# Patient Record
Sex: MALE | NOT HISPANIC OR LATINO | URBAN - METROPOLITAN AREA
[De-identification: names, ages, dates, MRNs, and addresses within clinical notes are randomized per-mention and may not be internally consistent; named-entity substitution may affect disease eponyms.]

---

## 2017-05-17 ENCOUNTER — OFFICE VISIT (OUTPATIENT)
Dept: URGENT CARE | Facility: PHYSICIAN GROUP | Age: 27
End: 2017-05-17
Payer: COMMERCIAL

## 2017-05-17 VITALS
DIASTOLIC BLOOD PRESSURE: 90 MMHG | TEMPERATURE: 98.5 F | HEART RATE: 125 BPM | SYSTOLIC BLOOD PRESSURE: 124 MMHG | BODY MASS INDEX: 39.62 KG/M2 | WEIGHT: 253 LBS | OXYGEN SATURATION: 92 % | RESPIRATION RATE: 18 BRPM

## 2017-05-17 DIAGNOSIS — J06.9 VIRAL UPPER RESPIRATORY ILLNESS: ICD-10-CM

## 2017-05-17 PROCEDURE — 99214 OFFICE O/P EST MOD 30 MIN: CPT | Performed by: FAMILY MEDICINE

## 2017-05-17 RX ORDER — CODEINE PHOSPHATE AND GUAIFENESIN 10; 100 MG/5ML; MG/5ML
5 SOLUTION ORAL EVERY 6 HOURS PRN
Qty: 240 ML | Refills: 1 | Status: SHIPPED | OUTPATIENT
Start: 2017-05-17 | End: 2017-10-31

## 2017-05-17 ASSESSMENT — ENCOUNTER SYMPTOMS
CHILLS: 1
SORE THROAT: 1
SWEATS: 1
SHORTNESS OF BREATH: 1
RHINORRHEA: 1
FEVER: 0
HEADACHES: 1
DIZZINESS: 0
HEMOPTYSIS: 0
HEARTBURN: 1
MYALGIAS: 1
COUGH: 1
WHEEZING: 0

## 2017-05-17 NOTE — MR AVS SNAPSHOT
Ben Oquendo   2017 4:30 PM   Office Visit   MRN: [id number]    Department:  [facility]   Dept Phone:  [phone]    Description:  Male : 1990   Provider:  Alexey Prescott M.D.           Reason for Visit     Cough cough x2 days with fatigue      Allergies as of 2017     No Known Allergies      You were diagnosed with     Viral upper respiratory illness   [495112]         Vital Signs     Blood Pressure Pulse Temperature Respirations Weight Oxygen Saturation    124/90 mmHg 125 36.9 °C (98.5 °F) 18 114.76 kg (253 lb) 92%    Smoking Status                   Never Smoker            Basic Information     Date Of Birth Sex Race Ethnicity Preferred Language    1990 Male  or  Non- English      Problem List              ICD-10-CM Priority Class Noted - Resolved    Migraines G43.909   2009 - Present    Mild intermittent asthma J45.20   Unknown - Present    Migraine headache G43.909   Unknown - Present    Back strain S39.012A   2015 - Present      Health Maintenance        Date Due Completion Dates    IMM HEP B VACCINE (1 of 3 - Primary Series) 1992 ---    IMM HEP A VACCINE (1 of 2 - Standard Series) 1993 ---    IMM HPV VACCINE (1 of 3 - Male 3 Dose Series) 2003 ---    IMM VARICELLA (CHICKENPOX) VACCINE (1 of 2 - 2 Dose Adolescent Series) 2005 ---    IMM DTaP/Tdap/Td Vaccine (1 - Tdap) 2011 ---    IMM PNEUMOCOCCAL 19-64 (ADULT) MEDIUM RISK SERIES (1 of 1 - PPSV23) 2011 ---            Current Immunizations     No immunizations on file.      Below and/or attached are the medications your provider expects you to take. Review all of your home medications and newly ordered medications with your provider and/or pharmacist. Follow medication instructions as directed by your provider and/or pharmacist. Please keep your medication list with you and share with your provider. Update the information when medications are  discontinued, doses are changed, or new medications (including over-the-counter products) are added; and carry medication information at all times in the event of emergency situations     Allergies:  No Known Allergies          Medications  Valid as of: May 17, 2017 -  5:37 PM    Generic Name Brand Name Tablet Size Instructions for use    Albuterol Sulfate (Aero Soln) albuterol 108 (90 BASE) MCG/ACT Inhale 1-2 Puffs by mouth every 6 hours as needed for Shortness of Breath (cough).        Cyclobenzaprine HCl (Tab) FLEXERIL 10 MG Take 1 Tab by mouth 3 times a day as needed.        Fluticasone Propionate (Suspension) FLONASE 50 MCG/ACT Spray 1 Spray in nose 2 times a day.        Guaifenesin-Codeine (Solution) ROBITUSSIN -10 mg/5mL Take 5 mL by mouth every 6 hours as needed for Cough.        Ibuprofen (Tab) MOTRIN 600 MG Take 1 Tab by mouth every 6 hours as needed.        SUMAtriptan Succinate (Tab) IMITREX 50 MG Take 50 mg by mouth Once PRN.        SUMAtriptan Succinate (Tab) IMITREX 100 MG Take 1 Tab by mouth Once PRN for Migraine for up to 1 dose.        .                 Medicines prescribed today were sent to:     [facility] [address] [place] [address] - [address] [place] [address] AT [facility]. & [facility] [facility]    [address] [facility] [place] [employer] [facility] [address]    Phone: [phone] Fax: [phone]    Open 24 Hours?: No      Medication refill instructions:       If your prescription bottle indicates you have medication refills left, it is not necessary to call your provider’s office. Please contact your pharmacy and they will refill your medication.    If your prescription bottle indicates you do not have any refills left, you may request refills at any time through one of the following ways: The online [employer] system (except Urgent Care), by calling your provider’s office, or by asking your pharmacy to contact your provider’s office with a refill request. Medication refills are processed only during regular business  hours and may not be available until the next business day. Your provider may request additional information or to have a follow-up visit with you prior to refilling your medication.   *Please Note: Medication refills are assigned a new Rx number when refilled electronically. Your pharmacy may indicate that no refills were authorized even though a new prescription for the same medication is available at the pharmacy. Please request the medicine by name with the pharmacy before contacting your provider for a refill.           [employer] Access Code: [email or web address]  Expires: 6/16/2017  5:37 PM    Protein Forest  A secure, online tool to manage your health information     [employer]’s Protein Forest® is a secure, online tool that connects you to your personalized health information from the privacy of your home -- day or night - making it very easy for you to manage your healthcare. Once the activation process is completed, you can even access your medical information using the Protein Forest jose angel, which is available for free in the Apple Jose Angel store or Google Play store.     Protein Forest provides the following levels of access (as shown below):   My Chart Features   [facility] Doctor [facility]  Specialists [facility]  Urgent  Care Non-[facility]  Primary Care  Doctor   Email your healthcare team securely and privately 24/7 X X X    Manage appointments: schedule your next appointment; view details of past/upcoming appointments X      Request prescription refills. X      View recent personal medical records, including lab and immunizations X X X X   View health record, including health history, allergies, medications X X X X   Read reports about your outpatient visits, procedures, consult and ER notes X X X X   See your discharge summary, which is a recap of your hospital and/or ER visit that includes your diagnosis, lab results, and care plan. X X       How to register for [employer] Forest:  1. Go to  [email or web address].  2. Click on the Sign Up  Now box, which takes you to the New Member Sign Up page. You will need to provide the following information:  a. Enter your [employer] Access Code exactly as it appears at the top of this page. (You will not need to use this code after you’ve completed the sign-up process. If you do not sign up before the expiration date, you must request a new code.)   b. Enter your date of birth.   c. Enter your home email address.   d. Click Submit, and follow the next screen’s instructions.  3. Create a [employer] ID. This will be your [employer] login ID and cannot be changed, so think of one that is secure and easy to remember.  4. Create a [employer] password. You can change your password at any time.  5. Enter your Password Reset Question and Answer. This can be used at a later time if you forget your password.   6. Enter your e-mail address. This allows you to receive e-mail notifications when new information is available in [employer].  7. Click Sign Up. You can now view your health information.    For assistance activating your [employer] account, call [phone]

## 2017-05-18 NOTE — PROGRESS NOTES
Subjective:      Ben Oquendo is a 26 y.o. male who presents with Cough            Cough  This is a new problem. The current episode started in the past 7 days (4 days). The problem has been gradually worsening. The problem occurs constantly. The cough is productive of sputum. Associated symptoms include chest pain, chills, ear congestion, headaches, heartburn, myalgias, nasal congestion, postnasal drip, a rash, rhinorrhea, a sore throat, shortness of breath and sweats. Pertinent negatives include no ear pain, fever, hemoptysis or wheezing. Associated symptoms comments: Baseline heartburn, CP with cough. The symptoms are aggravated by lying down (nighttime). He has tried OTC cough suppressant (cough med with codeine for prior, dayquil, nyquil) for the symptoms. The treatment provided moderate relief. His past medical history is significant for asthma and bronchitis. There is no history of environmental allergies or pneumonia.       Review of Systems   Constitutional: Positive for chills. Negative for fever.   HENT: Positive for postnasal drip, rhinorrhea and sore throat. Negative for ear pain.    Respiratory: Positive for cough and shortness of breath. Negative for hemoptysis and wheezing.    Cardiovascular: Positive for chest pain.   Gastrointestinal: Positive for heartburn.   Musculoskeletal: Positive for myalgias.   Skin: Positive for rash.   Neurological: Positive for headaches. Negative for dizziness.   Endo/Heme/Allergies: Negative for environmental allergies.     PMH:  has a past medical history of Asthma; Migraine headache; and Back strain (7/13/2015).  MEDS:   Current outpatient prescriptions:   •  sumatriptan (IMITREX) 100 MG tablet, Take 1 Tab by mouth Once PRN for Migraine for up to 1 dose., Disp: 10 Tab, Rfl: 3  •  cyclobenzaprine (FLEXERIL) 10 MG TABS, Take 1 Tab by mouth 3 times a day as needed., Disp: 30 Tab, Rfl: 0  •  ibuprofen (MOTRIN) 600 MG TABS, Take 1 Tab by mouth every 6 hours as  needed., Disp: 90 Tab, Rfl: 1  •  sumatriptan (IMITREX) 50 MG TABS, Take 50 mg by mouth Once PRN., Disp: , Rfl:   •  fluticasone (FLONASE) 50 MCG/ACT nasal spray, Spray 1 Spray in nose 2 times a day., Disp: 1 Bottle, Rfl: 0  •  albuterol (VENTOLIN OR PROVENTIL) 108 (90 BASE) MCG/ACT AERS, Inhale 1-2 Puffs by mouth every 6 hours as needed for Shortness of Breath (cough)., Disp: 1 Inhaler, Rfl: 0  ALLERGIES: No Known Allergies  SURGHX: History reviewed. No pertinent past surgical history.  SOCHX:  reports that he has never smoked. He has never used smokeless tobacco. He reports that he does not drink alcohol or use illicit drugs.  FH: Family history was reviewed, no pertinent findings to report       Objective:     /90 mmHg  Pulse 125  Temp(Src) 36.9 °C (98.5 °F)  Resp 18  Wt 114.76 kg (253 lb)  SpO2 92%     Physical Exam   Constitutional: He appears well-developed.   HENT:   Head: Normocephalic.   Right Ear: External ear normal.   Left Ear: External ear normal.   Mouth/Throat: Oropharyngeal exudate present.   Nasal congestion    Eyes: Right eye exhibits no discharge. Left eye exhibits no discharge.   Neck: Normal range of motion. No tracheal deviation present. No thyromegaly present.   Cardiovascular: Normal rate.  Exam reveals no friction rub.    No murmur heard.  Pulmonary/Chest: Effort normal. No stridor. No respiratory distress. He has no wheezes.   Abdominal: Soft. He exhibits no distension. There is no tenderness. There is no guarding.   Lymphadenopathy:     He has no cervical adenopathy.   Neurological: He is alert.   Skin: Skin is warm and dry. He is not diaphoretic.   Psychiatric: He has a normal mood and affect. His behavior is normal.               Assessment/Plan:     1. Viral upper respiratory illness  guaifenesin-codeine (ROBITUSSIN AC) Solution oral solution     Supportive care  Push fluids  Monitor temperature  Follow-up if symptoms worsen or fail to improve    JAYESH report (no narc Rx since  mid 2016)

## 2017-10-31 ENCOUNTER — OFFICE VISIT (OUTPATIENT)
Dept: MEDICAL GROUP | Facility: PHYSICIAN GROUP | Age: 27
End: 2017-10-31
Payer: COMMERCIAL

## 2017-10-31 ENCOUNTER — HOSPITAL ENCOUNTER (OUTPATIENT)
Dept: LAB | Facility: MEDICAL CENTER | Age: 27
End: 2017-10-31
Attending: NURSE PRACTITIONER
Payer: COMMERCIAL

## 2017-10-31 VITALS
DIASTOLIC BLOOD PRESSURE: 84 MMHG | TEMPERATURE: 97.7 F | BODY MASS INDEX: 40.18 KG/M2 | WEIGHT: 256 LBS | RESPIRATION RATE: 12 BRPM | OXYGEN SATURATION: 94 % | HEIGHT: 67 IN | HEART RATE: 108 BPM | SYSTOLIC BLOOD PRESSURE: 120 MMHG

## 2017-10-31 DIAGNOSIS — G43.109 MIGRAINE WITH AURA AND WITHOUT STATUS MIGRAINOSUS, NOT INTRACTABLE: ICD-10-CM

## 2017-10-31 DIAGNOSIS — F41.9 ANXIETY AND DEPRESSION: ICD-10-CM

## 2017-10-31 DIAGNOSIS — G89.29 CHRONIC LEFT-SIDED THORACIC BACK PAIN: ICD-10-CM

## 2017-10-31 DIAGNOSIS — Z23 NEED FOR VACCINATION: ICD-10-CM

## 2017-10-31 DIAGNOSIS — M54.6 CHRONIC LEFT-SIDED THORACIC BACK PAIN: ICD-10-CM

## 2017-10-31 DIAGNOSIS — F32.A ANXIETY AND DEPRESSION: ICD-10-CM

## 2017-10-31 DIAGNOSIS — Z76.89 ENCOUNTER TO ESTABLISH CARE: ICD-10-CM

## 2017-10-31 LAB
25(OH)D3 SERPL-MCNC: 8 NG/ML (ref 30–100)
BASOPHILS # BLD AUTO: 0.6 % (ref 0–1.8)
BASOPHILS # BLD: 0.07 K/UL (ref 0–0.12)
EOSINOPHIL # BLD AUTO: 0.06 K/UL (ref 0–0.51)
EOSINOPHIL NFR BLD: 0.5 % (ref 0–6.9)
ERYTHROCYTE [DISTWIDTH] IN BLOOD BY AUTOMATED COUNT: 40.4 FL (ref 35.9–50)
HCT VFR BLD AUTO: 51.1 % (ref 42–52)
HGB BLD-MCNC: 16.9 G/DL (ref 14–18)
IMM GRANULOCYTES # BLD AUTO: 0.09 K/UL (ref 0–0.11)
IMM GRANULOCYTES NFR BLD AUTO: 0.8 % (ref 0–0.9)
LYMPHOCYTES # BLD AUTO: 3.51 K/UL (ref 1–4.8)
LYMPHOCYTES NFR BLD: 30.4 % (ref 22–41)
MCH RBC QN AUTO: 30 PG (ref 27–33)
MCHC RBC AUTO-ENTMCNC: 33.1 G/DL (ref 33.7–35.3)
MCV RBC AUTO: 90.6 FL (ref 81.4–97.8)
MONOCYTES # BLD AUTO: 0.72 K/UL (ref 0–0.85)
MONOCYTES NFR BLD AUTO: 6.2 % (ref 0–13.4)
NEUTROPHILS # BLD AUTO: 7.08 K/UL (ref 1.82–7.42)
NEUTROPHILS NFR BLD: 61.5 % (ref 44–72)
NRBC # BLD AUTO: 0 K/UL
NRBC BLD AUTO-RTO: 0 /100 WBC
PLATELET # BLD AUTO: 354 K/UL (ref 164–446)
PMV BLD AUTO: 10.5 FL (ref 9–12.9)
RBC # BLD AUTO: 5.64 M/UL (ref 4.7–6.1)
TSH SERPL DL<=0.005 MIU/L-ACNC: 2.58 UIU/ML (ref 0.3–3.7)
VIT B12 SERPL-MCNC: 531 PG/ML (ref 211–911)
WBC # BLD AUTO: 11.5 K/UL (ref 4.8–10.8)

## 2017-10-31 PROCEDURE — 85025 COMPLETE CBC W/AUTO DIFF WBC: CPT

## 2017-10-31 PROCEDURE — 90471 IMMUNIZATION ADMIN: CPT | Performed by: NURSE PRACTITIONER

## 2017-10-31 PROCEDURE — 90686 IIV4 VACC NO PRSV 0.5 ML IM: CPT | Performed by: NURSE PRACTITIONER

## 2017-10-31 PROCEDURE — 84443 ASSAY THYROID STIM HORMONE: CPT

## 2017-10-31 PROCEDURE — 99215 OFFICE O/P EST HI 40 MIN: CPT | Mod: 25 | Performed by: NURSE PRACTITIONER

## 2017-10-31 PROCEDURE — 82607 VITAMIN B-12: CPT

## 2017-10-31 PROCEDURE — 36415 COLL VENOUS BLD VENIPUNCTURE: CPT

## 2017-10-31 PROCEDURE — 82306 VITAMIN D 25 HYDROXY: CPT

## 2017-10-31 RX ORDER — SUMATRIPTAN 100 MG/1
100 TABLET, FILM COATED ORAL
Qty: 10 TAB | Refills: 3 | Status: SHIPPED | OUTPATIENT
Start: 2017-10-31 | End: 2021-04-20

## 2017-10-31 RX ORDER — FLUOXETINE 20 MG/1
TABLET, FILM COATED ORAL
Qty: 30 TAB | Refills: 0 | Status: SHIPPED | OUTPATIENT
Start: 2017-10-31 | End: 2017-11-21 | Stop reason: SDUPTHER

## 2017-10-31 RX ORDER — ALBUTEROL SULFATE 90 UG/1
2 AEROSOL, METERED RESPIRATORY (INHALATION) EVERY 6 HOURS PRN
Qty: 8.5 G | Refills: 3 | Status: SHIPPED | OUTPATIENT
Start: 2017-10-31 | End: 2022-11-18

## 2017-10-31 RX ORDER — ERGOCALCIFEROL 1.25 MG/1
50000 CAPSULE ORAL
Qty: 12 CAP | Refills: 0 | Status: SHIPPED | OUTPATIENT
Start: 2017-10-31 | End: 2021-04-20

## 2017-10-31 ASSESSMENT — PATIENT HEALTH QUESTIONNAIRE - PHQ9
4. FEELING TIRED OR HAVING LITTLE ENERGY: 2
2. FEELING DOWN, DEPRESSED, IRRITABLE, OR HOPELESS: 3
SUM OF ALL RESPONSES TO PHQ QUESTIONS 1-9: 18
5. POOR APPETITE OR OVEREATING: 2
8. MOVING OR SPEAKING SO SLOWLY THAT OTHER PEOPLE COULD HAVE NOTICED. OR THE OPPOSITE, BEING SO FIGETY OR RESTLESS THAT YOU HAVE BEEN MOVING AROUND A LOT MORE THAN USUAL: 0
SUM OF ALL RESPONSES TO PHQ9 QUESTIONS 1 AND 2: 6
1. LITTLE INTEREST OR PLEASURE IN DOING THINGS: 3
9. THOUGHTS THAT YOU WOULD BE BETTER OFF DEAD, OR OF HURTING YOURSELF: 3
6. FEELING BAD ABOUT YOURSELF - OR THAT YOU ARE A FAILURE OR HAVE LET YOURSELF OR YOUR FAMILY DOWN: 3
3. TROUBLE FALLING OR STAYING ASLEEP OR SLEEPING TOO MUCH: 2
7. TROUBLE CONCENTRATING ON THINGS, SUCH AS READING THE NEWSPAPER OR WATCHING TELEVISION: 0

## 2017-10-31 NOTE — LETTER
October 31, 2017      Dear Ben:    I have started you on fluoxetine a medication to help with your mood. In 10-21 days, you may see improvement in sleep, appetite, and daytime energy levels. In 6 weeks, you should start to see improvement in mood and emotion control.  In the first 1-2 weeks you may experience flu-like symptoms of nausea, headache, diarrhea. This is your body's response to becoming used to the change in serotonin levels. These symptoms most often subside after 1-2 weeks. If you develop severe diarrhea, vomiting, or severe headache, please let me know.  It is very important to not discontinue the medication in the initial 6 weeks. If you do not see improvement in 6 weeks, we can discuss increasing the dose or changing the medication at this time.   The length of time we will treat you with this will be for a period of 6 months beyond the point of therapeutic response.   Antidepressants are not addictive. They do not cause tolerance; you will not require higher and higher doses once an adequate dose for you is determined.   You should not drink alcohol while taking antidepressants. It can block the effects of the medication, making them less effective. Alcohol is also a depressant, therefore this does not help your recovery from depression and/or anxiety.   Exercise is a synergistic effect. It makes your antidepressant more effective. A healthy diet, with moderate carbohydrate intake, low sugar intake, minimal caffeine, and eating every 4-6 hours, will augment your efforts to treat depression.  Good sleep hygiene will help as well, going to bed at a regular time, and sleeping 8 hours a night. You may try things such as yoga, meditation, pilates, or other cardiovascular exercise to help.   Do not stop taking this medication cold turkey. If you have a desire to stop, please contact me so we can plan a taper to decrease to avoid symptoms.   ER warning signs: If you experience any suicidal thoughts,  call 9-1-1 or go to ER immediately.        If you have any questions or concerns, please don't hesitate to call.        Sincerely,        JONNY Ness.    Electronically Signed

## 2017-10-31 NOTE — ASSESSMENT & PLAN NOTE
Ongoing chronic intermittent problem for about two years. Pain occurs about once a week. Turning the wrong way triggers the pain. The pain is in middle of back, left spine. States pain is like pinching pain localized to this area. Episodes of pain last a few hours. No radiating pain into buttocks or LLE. He does have intermittent RLE posterior leg pain. X-ray of thoracic spine July 2015 reveals normal thoracic spine. Flexaril is not helping. No other treatments tried. He does sit at a desk for most of the day with his work.

## 2017-10-31 NOTE — ASSESSMENT & PLAN NOTE
Chronic problem since about age 18. Worsening this year. States no major significant trigger, but multiple different stressors in his life. Has significant family history of depression on his paternal side. Reports depression daily. Cannot sleep; always waking up t/o the night. Reports only eating once daily. When he does eat, he over-eats. Drinks 1/2 pint whiskey on his own 1-2 nights/week, used to be daily. At work a test was due in 2022, and he thought to himself that it would not matter because he would not be here. States he wishes he was dead daily. He has come up with plan in his head many times, but fears death, therefore he says he will never be able to act on his thoughts. He also states his son keeps him going. He does have 3 guns in his home (lives with parents who are aware of depression), but reports none of his plans have involved a gun.   Music helps him, but he can get back to the spot of depression in a moment.   He has tried CBD, and for 3-4 days he did not feel as depressed.     EARL 7 Score: 14    Patient Health Questionaire    Little interest or pleasure in doing things?: 3   Feeling down, depressed, or hopeless?: 3  Trouble falling or staying asleep, or sleeping too much? : 2  Feeling tired or having little energy? : 2  Poor appetite or overeating? : 2  Feeling bad about yourself - or that you are a failure or have let yourself or your family down? : 3  Trouble concentrating on things, such as reading the newspaper or watching television? : 0  Moving or speaking so slowly that other people could have noticed.  Or the opposite - being so fidgety or restless that you have been moving around alot more than usual. : 0  Thoughts that you would be better off dead, or of hurting yourself?: 3  Patient Health Questionnaire Score: 18    If depressive symptoms identified deferred to follow up visit unless specifically addressed in assesment and plan.    Interpretation of PHQ-9 Total Score   Score Severity    15-19 Moderately Severe Depression

## 2017-10-31 NOTE — ASSESSMENT & PLAN NOTE
Long-standing history of migraines since around age 18. Migraine is rare, but has been having regular headaches the past few weeks. Occasionally has loss of peripheral vision for aura; this is when he takes the sumatriptan. Occasionally gets N/V. +photophobia and phonophobia.

## 2017-11-01 NOTE — PROGRESS NOTES
Chief Complaint   Patient presents with   • Establish Care     depression       HPI:    Ben Oquendo is a 27 y.o. male here to establish care and discuss depression specifically     Chronic left-sided thoracic back pain  Ongoing chronic intermittent problem for about two years. Pain occurs about once a week. Turning the wrong way triggers the pain. The pain is in middle of back, left spine. States pain is like pinching pain localized to this area. Episodes of pain last a few hours. No radiating pain into buttocks or LLE. He does have intermittent RLE posterior leg pain. X-ray of thoracic spine July 2015 reveals normal thoracic spine. Flexaril is not helping. No other treatments tried. He does sit at a desk for most of the day with his work.     Migraines  Long-standing history of migraines since around age 18. Migraine is rare, but has been having regular headaches the past few weeks. Occasionally has loss of peripheral vision for aura; this is when he takes the sumatriptan. Occasionally gets N/V. +photophobia and phonophobia.     Anxiety and depression  Chronic problem since about age 18. Worsening this year. States no major significant trigger, but multiple different stressors in his life. Has significant family history of depression on his paternal side. Reports depression daily. Cannot sleep; always waking up t/o the night. Reports only eating once daily. When he does eat, he over-eats. Drinks 1/2 pint whiskey on his own 1-2 nights/week, used to be daily. At work a test was due in 2022, and he thought to himself that it would not matter because he would not be here. States he wishes he was dead daily. He has come up with plan in his head many times, but fears death, therefore he says he will never be able to act on his thoughts. He also states his son keeps him going. He does have 3 guns in his home (lives with parents who are aware of depression), but reports none of his plans have involved a gun.    Music helps him, but he can get back to the spot of depression in a moment.   He has tried CBD, and for 3-4 days he did not feel as depressed.     EARL 7 Score: 14    Patient Health Questionaire    Little interest or pleasure in doing things?: 3   Feeling down, depressed, or hopeless?: 3  Trouble falling or staying asleep, or sleeping too much? : 2  Feeling tired or having little energy? : 2  Poor appetite or overeating? : 2  Feeling bad about yourself - or that you are a failure or have let yourself or your family down? : 3  Trouble concentrating on things, such as reading the newspaper or watching television? : 0  Moving or speaking so slowly that other people could have noticed.  Or the opposite - being so fidgety or restless that you have been moving around alot more than usual. : 0  Thoughts that you would be better off dead, or of hurting yourself?: 3  Patient Health Questionnaire Score: 18    If depressive symptoms identified deferred to follow up visit unless specifically addressed in assesment and plan.    Interpretation of PHQ-9 Total Score   Score Severity   15-19 Moderately Severe Depression     BMI 40.0-44.9, adult (CMS-[facility])  Does not exercise regularly. Diet is poor. Eating once a day and binge eating.     Current medicines (including changes today)  Current Outpatient Prescriptions   Medication Sig Dispense Refill   • sumatriptan (IMITREX) 100 MG tablet Take 1 Tab by mouth Once PRN for Migraine for up to 1 dose. 10 Tab 3   • albuterol 108 (90 Base) MCG/ACT Aero Soln inhalation aerosol Inhale 2 Puffs by mouth every 6 hours as needed for Shortness of Breath. 8.5 g 3   • fluoxetine (PROZAC) 20 MG tablet Week 1: Take 0.5 tab po daily. Week 2: Increase to 1 tab po daily 30 Tab 0   • vitamin D, Ergocalciferol, (DRISDOL) 80095 units Cap capsule Take 1 Cap by mouth every 7 days. 12 Cap 0     No current facility-administered medications for this visit.      He  has a past medical history of Asthma; Back  "strain (2015); and Migraine headache.  He  has no past surgical history on file.  Social History   Substance Use Topics   • Smoking status: Never Smoker   • Smokeless tobacco: Never Used   • Alcohol use 4.8 oz/week     8 Shots of liquor per week     Social History     Social History Narrative   • No narrative on file     Family History   Problem Relation Age of Onset   • No Known Problems Mother    • Hypertension Father    • Depression Sister    • No Known Problems Brother    • No Known Problems Son    • Depression Paternal Aunt    • Depression Paternal Uncle    • Suicide Attempts Paternal Uncle    • Depression Paternal Aunt    • Depression Paternal Uncle    • Alcohol/Drug Neg Hx      Family Status   Relation Status   • Mother Alive   • Father Alive   • Sister Alive   • Brother Alive   • Son Alive   • Paternal Aunt Alive   • Paternal Uncle    • Paternal Aunt Alive   • Paternal Uncle Alive   • Neg Hx      Health Maintenance Topics with due status: Overdue       Topic Date Due    IMM HEP B VACCINE 1992    IMM HEP A VACCINE 1993    IMM VARICELLA (CHICKENPOX) VACCINE 2005    IMM DTaP/Tdap/Td Vaccine 2011    IMM PNEUMOCOCCAL 19-64 (ADULT) MEDIUM RISK SERIES 2011    IMM INFLUENZA 2017        ROS  Constitutional: +fatigue and low energy. +insomnia. No F/C, night sweats  Head/Neck: No unusual headache or dizziness  Lungs: No cough, sob, dyspnea, wheezing  Cardiac: No chest pain, palpitations  Skin: No color changes, itching, dryness, rashes  Endo: No heat or cold intolerance, poor concentration, change in skin/hair      Objective:     Blood pressure 120/84, pulse (!) 108, temperature 36.5 °C (97.7 °F), resp. rate 12, height 1.702 m (5' 7\"), weight 116.1 kg (256 lb), SpO2 94 %. Body mass index is 40.1 kg/m².  Physical Exam:  Alert, oriented in no acute distress.  Eye contact is fair, speech goal directed, affect calm. Tearful.  HEENT: EOMI, conjunctiva non-injected, sclera " non-icteric. No lid edema or eye drainage  Nares patent with no significant congestion or drainage.   Gross hearing intact   Anu pinna, external auditory canals, TM pearly gray with normal light reflex bilaterally.  Oral mucous membranes pink and moist with no lesions. Oropharynx without erythema or exudate  Neck: supple with no cervical or supraclavicular lymphadenopathy, palpable thyroid nodules or thyromegaly.  Lungs: unlabored, clear to auscultation bilaterally with good excursion.  CV: regular rate and rhythm, no murmurs  Lower extremities color normal, vascularity normal, no edema  Skin: No rashes or lesions in visible areas  Neuro: CNs grossly intact. Gait steady. Strength all extremities 5/5.         Assessment and Plan:   Assessment/Plan:  1. Encounter to establish care    2. Anxiety and depression  Not controlled. Medium suicide risk. Reports he will not commit suicide due to his need to be here for his son, and his fear of death. He does have history of coming up with plan though even without past two weeks. We will check Labs to ensure no contributing factors to depression. I have discussed with patient the need to stop drinking alcohol completely. I will urgently refer to psychology as I do feel therapy is a large component in the management of his care. His sister has done well with fluoxetine for her depression, therefore I feel this is a good medication to start with. He will take 10 mg on week one and increased to 20 mg on week 2. He will also speak with his boss to see if he is able to use marijuana for this and still maintain his job. If he can, he would like to pursue this before fluoxetine. I will see patient in 3 weeks for very close follow-up and have significantly discussed with him the possibility of increased suicidal thoughts with medication. He is aware that he needs to go to emergency department if this occurs.  - TSH; Future  - VITAMIN D,25 HYDROXY; Future  - VITAMIN B12; Future  -  CBC WITH DIFFERENTIAL; Future  - REFERRAL TO PSYCHOLOGY    3. Chronic left-sided thoracic back pain  Chronic problem not controlled. Appears muscular, but muscle relaxer not helping. Enc proper posture and ergonomics at his desk at work. Enc heating pad and foam rolling. Refer to PT  - REFERRAL TO PHYSICAL THERAPY Reason for Therapy: Eval/Treat/Report    4. Migraine with aura and without status migrainosus, not intractable  Chronic stable problem. May use imitrex PRN  - sumatriptan (IMITREX) 100 MG tablet; Take 1 Tab by mouth Once PRN for Migraine for up to 1 dose.  Dispense: 10 Tab; Refill: 3    5. BMI 40.0-44.9, adult (CMS-[facility])  - Patient identified as having weight management issue.  Appropriate orders and counseling given.    6. Need for vaccination  I have placed the below orders and discussed them with an approved delegating provider. The MA is performing the below orders under the direction of Dr. Luna  - INFLUENZA VACCINE QUAD INJ >3Y(PF)    The total time spent seeing the patient in consultation, and formulating an action plan for this visit was 40 minutes.  Greater than 50% of this time was spent face to face counseling, discussing problems documented above, coordinating care and answering questions by the provider.        Follow up:  Return in about 3 weeks (around 11/21/2017).    Educated in proper administration of medication(s) ordered today including safety, possible SE, risks, benefits, rationale and alternatives to therapy.   Supportive care, differential diagnoses, and indications for immediate follow-up discussed with patient.    Pathogenesis of diagnosis discussed including typical length and natural progression.    Instructed to return to clinic or nearest emergency department for any change in condition, further concerns, or worsening of symptoms.  Patient states understanding of the plan of care and discharge instructions.    Please note that this dictation was created using voice recognition  software. I have made every reasonable attempt to correct obvious errors, but I expect that there are errors of grammar and possibly content that I did not discover before finalizing the note.    Followup: Return in about 3 weeks (around 11/21/2017). sooner should new symptoms or problems arise.

## 2017-11-15 ENCOUNTER — TELEPHONE (OUTPATIENT)
Dept: MEDICAL GROUP | Facility: PHYSICIAN GROUP | Age: 27
End: 2017-11-15

## 2017-11-15 NOTE — TELEPHONE ENCOUNTER
Please inform patient that we can have him do an application for a medical marijuana card. There is no prescription, rather the card can be taken and used for this purpose. Please ensure he is scheduled with ESTEFANY Schaefer

## 2017-11-21 ENCOUNTER — OFFICE VISIT (OUTPATIENT)
Dept: MEDICAL GROUP | Facility: PHYSICIAN GROUP | Age: 27
End: 2017-11-21
Payer: COMMERCIAL

## 2017-11-21 VITALS
WEIGHT: 252 LBS | TEMPERATURE: 97.5 F | OXYGEN SATURATION: 93 % | RESPIRATION RATE: 16 BRPM | DIASTOLIC BLOOD PRESSURE: 84 MMHG | SYSTOLIC BLOOD PRESSURE: 128 MMHG | HEIGHT: 67 IN | BODY MASS INDEX: 39.55 KG/M2 | HEART RATE: 76 BPM

## 2017-11-21 DIAGNOSIS — M54.6 CHRONIC LEFT-SIDED THORACIC BACK PAIN: ICD-10-CM

## 2017-11-21 DIAGNOSIS — F32.A ANXIETY AND DEPRESSION: ICD-10-CM

## 2017-11-21 DIAGNOSIS — G89.29 CHRONIC LEFT-SIDED THORACIC BACK PAIN: ICD-10-CM

## 2017-11-21 DIAGNOSIS — F41.9 ANXIETY AND DEPRESSION: ICD-10-CM

## 2017-11-21 PROCEDURE — 99213 OFFICE O/P EST LOW 20 MIN: CPT | Performed by: NURSE PRACTITIONER

## 2017-11-21 RX ORDER — FLUOXETINE 20 MG/1
20 TABLET, FILM COATED ORAL DAILY
Qty: 90 TAB | Refills: 3 | Status: SHIPPED | OUTPATIENT
Start: 2017-11-21 | End: 2017-12-13 | Stop reason: SDUPTHER

## 2017-11-21 ASSESSMENT — PATIENT HEALTH QUESTIONNAIRE - PHQ9
SUM OF ALL RESPONSES TO PHQ QUESTIONS 1-9: 15
4. FEELING TIRED OR HAVING LITTLE ENERGY: 2
SUM OF ALL RESPONSES TO PHQ9 QUESTIONS 1 AND 2: 4
3. TROUBLE FALLING OR STAYING ASLEEP OR SLEEPING TOO MUCH: 3
7. TROUBLE CONCENTRATING ON THINGS, SUCH AS READING THE NEWSPAPER OR WATCHING TELEVISION: 2
6. FEELING BAD ABOUT YOURSELF - OR THAT YOU ARE A FAILURE OR HAVE LET YOURSELF OR YOUR FAMILY DOWN: 2
5. POOR APPETITE OR OVEREATING: 0
9. THOUGHTS THAT YOU WOULD BE BETTER OFF DEAD, OR OF HURTING YOURSELF: 2
1. LITTLE INTEREST OR PLEASURE IN DOING THINGS: 2
2. FEELING DOWN, DEPRESSED, IRRITABLE, OR HOPELESS: 2
8. MOVING OR SPEAKING SO SLOWLY THAT OTHER PEOPLE COULD HAVE NOTICED. OR THE OPPOSITE, BEING SO FIGETY OR RESTLESS THAT YOU HAVE BEEN MOVING AROUND A LOT MORE THAN USUAL: 0

## 2017-11-21 NOTE — ASSESSMENT & PLAN NOTE
Ongoing chronic intermittent problem for about two years. Pain occurs about once a week. Turning the wrong way triggers the pain. The pain is in middle of back, left spine. States pain is like pinching pain localized to this area. Episodes of pain last a few hours. No radiating pain into buttocks or LLE. He does have intermittent RLE posterior leg pain. X-ray of thoracic spine July 2015 reveals normal thoracic spine. Flexaril is not helping. No other treatments tried. He does sit at a desk for most of the day with his work. Postures appears poor.

## 2017-11-21 NOTE — ASSESSMENT & PLAN NOTE
Ongoing problem, new to me as of 3 weeks ago.  He has been on fluoxetine 20 mg daily for 3 weeks now. Reports before he was thinking about death all the time, but he has noticed this has lessened. Not noticing any significant changes yet though otherwise. He was using CBD, which he believes might have helped, but he is concerned about drug screens with this. He has an appointment scheduled next Monday with [facility]. Reports suicidal thoughts or plan are not occurring.     EARL 7 Score: 9 (down from 14 3 weeks ago)    Patient Health Questionaire    Little interest or pleasure in doing things?: 2   Feeling down, depressed, or hopeless?: 2  Trouble falling or staying asleep, or sleeping too much? : 3  Feeling tired or having little energy? : 2  Poor appetite or overeating? : 0  Feeling bad about yourself - or that you are a failure or have let yourself or your family down? : 2  Trouble concentrating on things, such as reading the newspaper or watching television? : 2  Moving or speaking so slowly that other people could have noticed.  Or the opposite - being so fidgety or restless that you have been moving around alot more than usual. : 0  Thoughts that you would be better off dead, or of hurting yourself?: 2  Patient Health Questionnaire Score: 15 (down from 18 3 weeks ago)    Interpretation of PHQ-9 Total Score   Score Severity   15-19 Moderately Severe Depression

## 2017-11-22 NOTE — PROGRESS NOTES
Subjective:     Chief Complaint   Patient presents with   • Follow-Up     1 month       HPI  Ben Oquendo is a 27 y.o. male here today for 3 week f/u     Chronic left-sided thoracic back pain  Ongoing chronic intermittent problem for about two years. Pain occurs about once a week. Turning the wrong way triggers the pain. The pain is in middle of back, left spine. States pain is like pinching pain localized to this area. Episodes of pain last a few hours. No radiating pain into buttocks or LLE. He does have intermittent RLE posterior leg pain. X-ray of thoracic spine July 2015 reveals normal thoracic spine. Flexaril is not helping. No other treatments tried. He does sit at a desk for most of the day with his work. Postures appears poor.     Anxiety and depression  Ongoing problem, new to me as of 3 weeks ago.  He has been on fluoxetine 20 mg daily for 3 weeks now. Reports before he was thinking about death all the time, but he has noticed this has lessened. Not noticing any significant changes yet though otherwise. He was using CBD, which he believes might have helped, but he is concerned about drug screens with this. He has an appointment scheduled next Monday with [facility]. Reports suicidal thoughts or plan are not occurring.     EARL 7 Score: 9 (down from 14 3 weeks ago)    Patient Health Questionaire    Little interest or pleasure in doing things?: 2   Feeling down, depressed, or hopeless?: 2  Trouble falling or staying asleep, or sleeping too much? : 3  Feeling tired or having little energy? : 2  Poor appetite or overeating? : 0  Feeling bad about yourself - or that you are a failure or have let yourself or your family down? : 2  Trouble concentrating on things, such as reading the newspaper or watching television? : 2  Moving or speaking so slowly that other people could have noticed.  Or the opposite - being so fidgety or restless that you have been moving around alot more than usual. :  "0  Thoughts that you would be better off dead, or of hurting yourself?: 2  Patient Health Questionnaire Score: 15 (down from 18 3 weeks ago)    Interpretation of PHQ-9 Total Score   Score Severity   15-19 Moderately Severe Depression           Diagnoses of Anxiety and depression and Chronic left-sided thoracic back pain were pertinent to this visit.    Allergies: Patient has no known allergies.  Current medicines (including changes today)  Current Outpatient Prescriptions   Medication Sig Dispense Refill   • fluoxetine (PROZAC) 20 MG tablet Take 1 Tab by mouth every day. 90 Tab 3   • sumatriptan (IMITREX) 100 MG tablet Take 1 Tab by mouth Once PRN for Migraine for up to 1 dose. 10 Tab 3   • albuterol 108 (90 Base) MCG/ACT Aero Soln inhalation aerosol Inhale 2 Puffs by mouth every 6 hours as needed for Shortness of Breath. 8.5 g 3   • vitamin D, Ergocalciferol, (DRISDOL) 88219 units Cap capsule Take 1 Cap by mouth every 7 days. 12 Cap 0     No current facility-administered medications for this visit.        He  has a past medical history of Asthma; Back strain (7/13/2015); and Migraine headache.    ROS  As stated in HPI      Objective:     Blood pressure 128/84, pulse 76, temperature 36.4 °C (97.5 °F), resp. rate 16, height 1.702 m (5' 7\"), weight 114.3 kg (252 lb), SpO2 93 %. Body mass index is 39.47 kg/m².  Physical Exam:  General: Alert, oriented, in no acute distress.  Eye contact is fair, speech goal directed, affect calm  CNs grossly intact.  Gross hearing intact.  MSK: Spine without deformity and no significant curvature on forward bend. +TTP of thoracic spinal muscles just left of spine and under scapula. Lumbar paraspinous muscles without tenderness or spasm. No spinous process tenderness. Able to perform flexion, extension, and lateral bend without difficulty. Able to perform FAROM of neck and bilateral shoulders without pain.  Gait steady.     Assessment and Plan:   Assessment/Plan:  1. Anxiety and " depression  Not controlled, but slight improvement present. Continue fluoxetine 20 mg daily for another 3 weeks. I will call patient by phone in 3 weeks to see how he is feeling and if we need to increase dose at this time, we will do it over the phone with a 6 week follow-up. He will [facility] with [facility] next week for therapy    2. Chronic left-sided thoracic back pain  Chronic problem not controlled. Appears muscular, but muscle relaxer not helping. Enc proper posture and ergonomics at his desk at work. Enc heating pad and foam rolling. Refer to PT  - REFERRAL TO PHYSICAL THERAPY Reason for Therapy: Eval/Treat/Report       Follow up:  Return in about 6 weeks (around 1/2/2018).    Educated in proper administration of medication(s) ordered today including safety, possible SE, risks, benefits, rationale and alternatives to therapy.   Supportive care, differential diagnoses, and indications for immediate follow-up discussed with patient.    Pathogenesis of diagnosis discussed including typical length and natural progression.    Instructed to return to clinic or nearest emergency department for any change in condition, further concerns, or worsening of symptoms.  Patient states understanding of the plan of care and discharge instructions.      Please note that this dictation was created using voice recognition software. I have made every reasonable attempt to correct obvious errors, but I expect that there are errors of grammar and possibly content that I did not discover before finalizing the note.    Followup: Return in about 6 weeks (around 1/2/2018). sooner should new symptoms or problems arise.

## 2017-12-13 ENCOUNTER — TELEPHONE (OUTPATIENT)
Dept: MEDICAL GROUP | Facility: PHYSICIAN GROUP | Age: 27
End: 2017-12-13

## 2017-12-13 RX ORDER — FLUOXETINE 20 MG/1
30 TABLET, FILM COATED ORAL DAILY
Qty: 135 TAB | Refills: 3 | Status: SHIPPED | OUTPATIENT
Start: 2017-12-13 | End: 2018-01-15

## 2017-12-14 NOTE — TELEPHONE ENCOUNTER
Spoke with patient who informs me depression is staying about the same. He would like Prozac increased. We will increase to 30 mg for 3 weeks and I will follow-up with him by phone again    JONNY Ness.

## 2017-12-28 ENCOUNTER — OFFICE VISIT (OUTPATIENT)
Dept: MEDICAL GROUP | Facility: PHYSICIAN GROUP | Age: 27
End: 2017-12-28
Payer: COMMERCIAL

## 2017-12-28 VITALS
TEMPERATURE: 97.7 F | OXYGEN SATURATION: 95 % | SYSTOLIC BLOOD PRESSURE: 104 MMHG | HEIGHT: 67 IN | HEART RATE: 98 BPM | DIASTOLIC BLOOD PRESSURE: 84 MMHG | BODY MASS INDEX: 38.14 KG/M2 | RESPIRATION RATE: 12 BRPM | WEIGHT: 243 LBS

## 2017-12-28 DIAGNOSIS — R31.0 GROSS HEMATURIA: ICD-10-CM

## 2017-12-28 LAB
APPEARANCE UR: CLEAR
BILIRUB UR STRIP-MCNC: NORMAL MG/DL
COLOR UR AUTO: YELLOW
GLUCOSE UR STRIP.AUTO-MCNC: NORMAL MG/DL
KETONES UR STRIP.AUTO-MCNC: NORMAL MG/DL
LEUKOCYTE ESTERASE UR QL STRIP.AUTO: NORMAL
NITRITE UR QL STRIP.AUTO: NORMAL
PH UR STRIP.AUTO: 6 [PH] (ref 5–8)
PROT UR QL STRIP: NORMAL MG/DL
RBC UR QL AUTO: NORMAL
SP GR UR STRIP.AUTO: 1
UROBILINOGEN UR STRIP-MCNC: 0.2 MG/DL

## 2017-12-28 PROCEDURE — 81002 URINALYSIS NONAUTO W/O SCOPE: CPT | Performed by: NURSE PRACTITIONER

## 2017-12-28 PROCEDURE — 99213 OFFICE O/P EST LOW 20 MIN: CPT | Performed by: NURSE PRACTITIONER

## 2017-12-29 NOTE — PROGRESS NOTES
"  Subjective:     Chief Complaint   Patient presents with   • Blood in Urine       HPI  Ben Oquendo is a 27 y.o. male here today for hematuria. Yesterday he woke up and with his first urination, still felt like he had to urinate but could not go. 5 minutes later felt urinary urgency, pressure, and pain in suprapubic region, also some in left flank region. This went away, and he went to work. Had urination again and within 5 minutes later pain became severe and he had to leave work. Once home, he had white lips, shivering, and urination was painful, urine was dark red/brown. After this, he felt instantly improved. 30 minutes later urinated again, and this was clear yellow. No pain or hematuria since. No further frequency, urgency, F/C. No urethral discharge. No change in sexual partner for 2 years.     The encounter diagnosis was Gross hematuria.    Allergies: Patient has no known allergies.  Current medicines (including changes today)  Current Outpatient Prescriptions   Medication Sig Dispense Refill   • fluoxetine (PROZAC) 20 MG tablet Take 1.5 Tabs by mouth every day. 135 Tab 3   • sumatriptan (IMITREX) 100 MG tablet Take 1 Tab by mouth Once PRN for Migraine for up to 1 dose. 10 Tab 3   • albuterol 108 (90 Base) MCG/ACT Aero Soln inhalation aerosol Inhale 2 Puffs by mouth every 6 hours as needed for Shortness of Breath. 8.5 g 3   • vitamin D, Ergocalciferol, (DRISDOL) 10679 units Cap capsule Take 1 Cap by mouth every 7 days. 12 Cap 0     No current facility-administered medications for this visit.        He  has a past medical history of Asthma; Back strain (7/13/2015); and Migraine headache.        ROS  As stated in HPI      Objective:     Blood pressure 104/84, pulse 98, temperature 36.5 °C (97.7 °F), resp. rate 12, height 1.702 m (5' 7\"), weight 110.2 kg (243 lb), SpO2 95 %. Body mass index is 38.06 kg/m².  Physical Exam:  General: Alert, oriented, in no acute distress.  Eye contact is good, speech " goal directed, affect calm  CNs grossly intact.  Gross hearing intact.  Abdomen: Soft, ND, non-tender. No CVAT or suprapubic tenderness  Ext: no edema, normal color and temperature.   Skin: No rashes or lesions in visible areas  Gait steady.      Ref. Range 12/28/2017 15:59   POC Color Latest Ref Range: Negative  yellow   POC Appearance Latest Ref Range: Negative  clear   POC Specific Gravity Latest Ref Range: <1.005 - >1.030  1.005   POC Urine PH Latest Ref Range: 5.0 - 8.0  6.0   POC Glucose Latest Ref Range: Negative mg/dL neg   POC Ketones Latest Ref Range: Negative mg/dL neg   POC Protein Latest Ref Range: Negative mg/dL neg   POC Nitrites Latest Ref Range: Negative  neg   POC Leukocyte Esterase Latest Ref Range: Negative  neg   POC Blood Latest Ref Range: Negative  neg   POC Biliruben Latest Ref Range: Negative mg/dL neg   POC Urobiligen Latest Ref Range: Negative (0.2) mg/dL 0.2       Assessment and Plan:   Assessment/Plan:  1. Gross hematuria  Discussed that I suspect this was an episode of kidney stone based on history of waxing and waning pain, with resolution after episode of severe pain. Urine normal today. Gave him urine kit and strainer in case episode happens again. Monitor.   - POCT Urinalysis  - URINALYSIS,CULTURE IF INDICATED; Future  - STONE ANALYSIS       Follow up:  Return in about 2 months (around 2/28/2018), or depression.    Educated in proper administration of medication(s) ordered today including safety, possible SE, risks, benefits, rationale and alternatives to therapy.   Supportive care, differential diagnoses, and indications for immediate follow-up discussed with patient.    Pathogenesis of diagnosis discussed including typical length and natural progression.    Instructed to return to clinic or nearest emergency department for any change in condition, further concerns, or worsening of symptoms.  Patient states understanding of the plan of care and discharge instructions.      Please  note that this dictation was created using voice recognition software. I have made every reasonable attempt to correct obvious errors, but I expect that there are errors of grammar and possibly content that I did not discover before finalizing the note.    Followup: Return in about 2 months (around 2/28/2018), or depression. sooner should new symptoms or problems arise.

## 2018-01-15 DIAGNOSIS — F41.9 ANXIETY AND DEPRESSION: ICD-10-CM

## 2018-01-15 DIAGNOSIS — F32.A ANXIETY AND DEPRESSION: ICD-10-CM

## 2018-01-15 RX ORDER — FLUOXETINE HYDROCHLORIDE 40 MG/1
40 CAPSULE ORAL DAILY
Qty: 90 CAP | Refills: 1 | Status: SHIPPED | OUTPATIENT
Start: 2018-01-15 | End: 2018-07-16 | Stop reason: SDUPTHER

## 2018-07-16 DIAGNOSIS — F32.A ANXIETY AND DEPRESSION: ICD-10-CM

## 2018-07-16 DIAGNOSIS — F41.9 ANXIETY AND DEPRESSION: ICD-10-CM

## 2018-07-16 RX ORDER — FLUOXETINE HYDROCHLORIDE 40 MG/1
CAPSULE ORAL
Qty: 90 CAP | Refills: 1 | Status: SHIPPED | OUTPATIENT
Start: 2018-07-16 | End: 2021-04-20

## 2019-04-11 ENCOUNTER — IMMUNIZATION (OUTPATIENT)
Dept: SOCIAL WORK | Facility: CLINIC | Age: 29
End: 2019-04-11

## 2019-04-11 DIAGNOSIS — Z23 NEED FOR VACCINATION: ICD-10-CM

## 2019-04-11 PROCEDURE — 90686 IIV4 VACC NO PRSV 0.5 ML IM: CPT | Performed by: REGISTERED NURSE

## 2021-04-20 ENCOUNTER — OFFICE VISIT (OUTPATIENT)
Dept: URGENT CARE | Facility: PHYSICIAN GROUP | Age: 31
End: 2021-04-20
Payer: COMMERCIAL

## 2021-04-20 VITALS
TEMPERATURE: 98.2 F | DIASTOLIC BLOOD PRESSURE: 92 MMHG | OXYGEN SATURATION: 96 % | SYSTOLIC BLOOD PRESSURE: 122 MMHG | RESPIRATION RATE: 20 BRPM | HEART RATE: 100 BPM

## 2021-04-20 DIAGNOSIS — R68.89 FLU-LIKE SYMPTOMS: ICD-10-CM

## 2021-04-20 DIAGNOSIS — Z20.822 SUSPECTED COVID-19 VIRUS INFECTION: ICD-10-CM

## 2021-04-20 DIAGNOSIS — R05.9 COUGH: ICD-10-CM

## 2021-04-20 LAB
FLUAV+FLUBV AG SPEC QL IA: NEGATIVE
INT CON NEG: NEGATIVE
INT CON POS: POSITIVE

## 2021-04-20 PROCEDURE — 99213 OFFICE O/P EST LOW 20 MIN: CPT | Performed by: NURSE PRACTITIONER

## 2021-04-20 PROCEDURE — 87804 INFLUENZA ASSAY W/OPTIC: CPT | Performed by: NURSE PRACTITIONER

## 2021-04-20 RX ORDER — DEXTROMETHORPHAN HYDROBROMIDE AND PROMETHAZINE HYDROCHLORIDE 15; 6.25 MG/5ML; MG/5ML
5 SYRUP ORAL EVERY 4 HOURS PRN
Qty: 100 ML | Refills: 0 | Status: SHIPPED | OUTPATIENT
Start: 2021-04-20 | End: 2022-11-18

## 2021-04-20 ASSESSMENT — ENCOUNTER SYMPTOMS
HEADACHES: 0
CHILLS: 0
DIARRHEA: 1
COUGH: 1
SORE THROAT: 1
WHEEZING: 0
VOMITING: 0
ABDOMINAL PAIN: 0
FEVER: 0
SHORTNESS OF BREATH: 0
SPUTUM PRODUCTION: 0
DIZZINESS: 0
MYALGIAS: 0
NAUSEA: 1

## 2021-04-20 NOTE — PROGRESS NOTES
Subjective:   Ben Oqunedo is a 30 y.o. male who presents for Fatigue (cough, sore throat pressire in ears both of them x2 days)      HPI  22-year-old male patient in urgent care for new onset fatigue, dry cough, sore throat and pressure in ears for the last 2 days.  Patient denies any Covid exposure.  Does state that he has some nausea and diarrhea.  He is taken over-the-counter DayQuil and NyQuil with no relief of symptoms.    Review of Systems   Constitutional: Positive for malaise/fatigue. Negative for chills and fever.   HENT: Positive for ear pain and sore throat.    Respiratory: Positive for cough. Negative for sputum production, shortness of breath and wheezing.    Gastrointestinal: Positive for diarrhea and nausea. Negative for abdominal pain and vomiting.   Musculoskeletal: Negative for myalgias.   Neurological: Negative for dizziness and headaches.   All other systems reviewed and are negative.      Patient Active Problem List   Diagnosis   • Migraines   • Exercise-induced asthma   • Chronic left-sided thoracic back pain   • BMI 40.0-44.9, adult (HCC)   • Anxiety and depression     History reviewed. No pertinent surgical history.  Social History     Tobacco Use   • Smoking status: Never Smoker   • Smokeless tobacco: Never Used   Substance Use Topics   • Alcohol use: Yes     Alcohol/week: 4.8 oz     Types: 8 Shots of liquor per week   • Drug use: Yes     Types: Marijuana     Comment: occasionally       Family History   Problem Relation Age of Onset   • No Known Problems Mother    • Hypertension Father    • Depression Sister    • No Known Problems Brother    • No Known Problems Son    • Depression Paternal Aunt    • Depression Paternal Uncle    • Suicide Attempts Paternal Uncle    • Depression Paternal Aunt    • Depression Paternal Uncle    • Alcohol/Drug Neg Hx       (Allergies, Medications, & Tobacco/Substance Use were reconciled by the Medical Assistant and reviewed by myself. The family history  is prepopulated)     Objective:     /92   Pulse 100   Temp 36.8 °C (98.2 °F) (Temporal)   Resp 20   SpO2 96%     Physical Exam  Vitals reviewed.   HENT:      Right Ear: Tympanic membrane, ear canal and external ear normal.      Left Ear: Tympanic membrane, ear canal and external ear normal.      Nose: Nose normal.      Mouth/Throat:      Lips: Pink.      Mouth: Mucous membranes are moist.      Pharynx: Uvula midline.   Cardiovascular:      Rate and Rhythm: Normal rate and regular rhythm.      Heart sounds: Normal heart sounds.   Pulmonary:      Effort: Pulmonary effort is normal.      Breath sounds: Normal breath sounds.   Abdominal:      General: Abdomen is flat. Bowel sounds are normal.      Palpations: Abdomen is soft.   Neurological:      Mental Status: He is alert and oriented to person, place, and time.   Psychiatric:         Mood and Affect: Mood normal.         Behavior: Behavior normal.         Thought Content: Thought content normal.         Judgment: Judgment normal.         Assessment/Plan:     1. Flu-like symptoms  POCT Influenza A/B   2. Suspected COVID-19 virus infection  COVID/SARS CoV-2 PCR   3. Cough  promethazine-dextromethorphan (PROMETHAZINE-DM) 6.25-15 MG/5ML syrup     Discussed Physical examination findings as well as negative influenza testing in clinic with patient are likely viral in etiology and could be due to COVID so will perform testing. Advised patient to remain in self isolation until cleared by a negative test or the health department, if they test positive. If exposed to COVID, advised to stay home for 10 days post exposure due to the possibility of developing symptoms day 2-10 post exposure. Will release results to [facility]. Strict ER precautions provided for worsening symptoms including SOB, difficulty breathing or high fever unable to be controlled by OTC tylenol or NSAIDS. Viral illness are largely self limiting and patient should increase fluids using electrolyte  enriched beverages as well as OTC medications such as tylenol and ibuprofen per 's instructions.      Appropriate PPE worn at all times by provider. Patient had face mask on for entirety of visit other than during oropharyngeal examination    Work note provided    Differential diagnosis, natural history, supportive care, and indications for immediate follow-up discussed.    Advised the patient to follow-up with the primary care physician for recheck, reevaluation, and consideration of further management.    Please note that this dictation was created using voice recognition software. I have made a reasonable attempt to correct obvious errors, but I expect that there are errors of grammar and possibly content that I did not discover before finalizing the note.    This note was electronically signed JONH Mcdonald

## 2022-11-18 ENCOUNTER — OFFICE VISIT (OUTPATIENT)
Dept: URGENT CARE | Facility: PHYSICIAN GROUP | Age: 32
End: 2022-11-18
Payer: COMMERCIAL

## 2022-11-18 ENCOUNTER — HOSPITAL ENCOUNTER (OUTPATIENT)
Facility: MEDICAL CENTER | Age: 32
End: 2022-11-18
Attending: STUDENT IN AN ORGANIZED HEALTH CARE EDUCATION/TRAINING PROGRAM
Payer: COMMERCIAL

## 2022-11-18 VITALS
WEIGHT: 270 LBS | TEMPERATURE: 97.7 F | DIASTOLIC BLOOD PRESSURE: 86 MMHG | SYSTOLIC BLOOD PRESSURE: 150 MMHG | RESPIRATION RATE: 16 BRPM | HEART RATE: 90 BPM | HEIGHT: 67 IN | BODY MASS INDEX: 42.38 KG/M2 | OXYGEN SATURATION: 95 %

## 2022-11-18 DIAGNOSIS — J02.9 PHARYNGITIS, UNSPECIFIED ETIOLOGY: ICD-10-CM

## 2022-11-18 DIAGNOSIS — J03.90 EXUDATIVE TONSILLITIS: ICD-10-CM

## 2022-11-18 DIAGNOSIS — J06.9 UPPER RESPIRATORY TRACT INFECTION, UNSPECIFIED TYPE: ICD-10-CM

## 2022-11-18 LAB
INT CON NEG: NORMAL
INT CON POS: NORMAL
S PYO AG THROAT QL: NEGATIVE

## 2022-11-18 PROCEDURE — 87077 CULTURE AEROBIC IDENTIFY: CPT

## 2022-11-18 PROCEDURE — 87070 CULTURE OTHR SPECIMN AEROBIC: CPT

## 2022-11-18 PROCEDURE — 0240U HCHG SARS-COV-2 COVID-19 NFCT DS RESP RNA 3 TRGT MIC: CPT

## 2022-11-18 PROCEDURE — 87880 STREP A ASSAY W/OPTIC: CPT | Performed by: STUDENT IN AN ORGANIZED HEALTH CARE EDUCATION/TRAINING PROGRAM

## 2022-11-18 PROCEDURE — 99213 OFFICE O/P EST LOW 20 MIN: CPT | Performed by: STUDENT IN AN ORGANIZED HEALTH CARE EDUCATION/TRAINING PROGRAM

## 2022-11-18 RX ORDER — METHYLPREDNISOLONE 4 MG/1
TABLET ORAL
Qty: 21 TABLET | Refills: 0 | Status: SHIPPED | OUTPATIENT
Start: 2022-11-18 | End: 2022-11-29

## 2022-11-18 RX ORDER — LIDOCAINE HYDROCHLORIDE 20 MG/ML
5 SOLUTION OROPHARYNGEAL PRN
Qty: 100 ML | Refills: 0 | Status: SHIPPED | OUTPATIENT
Start: 2022-11-18 | End: 2022-11-23

## 2022-11-18 ASSESSMENT — ENCOUNTER SYMPTOMS
PALPITATIONS: 0
HEADACHES: 0
SHORTNESS OF BREATH: 0
CHILLS: 0
DIZZINESS: 0
DIARRHEA: 0
WHEEZING: 0
CONSTIPATION: 0
VOMITING: 0
NAUSEA: 0
COUGH: 0
FEVER: 0
SORE THROAT: 1
ABDOMINAL PAIN: 0

## 2022-11-18 NOTE — LETTER
November 18, 2022    To Whom It May Concern:         This is confirmation that Ben Oquendo attended his scheduled appointment with Gisella Alvarez P.A.-C. on 11/18/22.  Please excuse work absences today through 11/21/22 for medical reasons. Ben can return to work on 11/22/2022 or earlier, if symptoms have improved/resolved and he has been without a fever for 24 hours (without treatment by antipyretic agents).         If you have any questions please do not hesitate to call me at the phone number listed below.    Sincerely,    Gisella Alvarez P.A.-C.  [phone]

## 2022-11-19 LAB
FLUAV RNA SPEC QL NAA+PROBE: NEGATIVE
FLUBV RNA SPEC QL NAA+PROBE: NEGATIVE
SARS-COV-2 RNA RESP QL NAA+PROBE: NOTDETECTED
SPECIMEN SOURCE: NORMAL

## 2022-11-19 NOTE — PROGRESS NOTES
"Subjective     Ben Oquendo is a 32 y.o. male who presents with Sore Throat (Started 4 days ago , congestion )            Ben is a 32 y.o. male who presents with symptoms of sore throat and nasal congestion starting approximately 4 days ago.  Patient states sore throat is a sore symptom and has progressively worsened since onset.  Patient denies fever/chills, cough, shortness of breath, wheezing.  No abdominal pain, N/V/D.  Patient denies sick contacts or close contacts experiencing similar symptoms.      Review of Systems   Constitutional:  Positive for malaise/fatigue. Negative for chills and fever.   HENT:  Positive for congestion, ear pain and sore throat.    Respiratory:  Negative for cough, shortness of breath and wheezing.    Cardiovascular:  Negative for chest pain and palpitations.   Gastrointestinal:  Negative for abdominal pain, constipation, diarrhea, nausea and vomiting.   Neurological:  Negative for dizziness and headaches.   All other systems reviewed and are negative.           Objective     BP (!) 150/86 (BP Location: Left arm, Patient Position: Sitting, BP Cuff Size: Adult)   Pulse 90   Temp 36.5 °C (97.7 °F) (Temporal)   Resp 16   Ht 1.702 m (5' 7\")   Wt 122 kg (270 lb)   SpO2 95%   BMI 42.29 kg/m²      Physical Exam  Vitals reviewed.   Constitutional:       General: He is not in acute distress.     Appearance: Normal appearance. He is ill-appearing. He is not toxic-appearing.   HENT:      Head: Normocephalic and atraumatic.      Right Ear: Tympanic membrane, ear canal and external ear normal.      Left Ear: Tympanic membrane, ear canal and external ear normal.      Nose: Congestion present.      Mouth/Throat:      Lips: Pink.      Mouth: Mucous membranes are moist.      Pharynx: Oropharynx is clear. Uvula midline. Posterior oropharyngeal erythema present. No oropharyngeal exudate.      Tonsils: Tonsillar exudate present. 1+ on the right. 1+ on the left.   Eyes:      Extraocular " Movements: Extraocular movements intact.      Conjunctiva/sclera: Conjunctivae normal.      Pupils: Pupils are equal, round, and reactive to light.   Cardiovascular:      Pulses: Normal pulses.      Heart sounds: Normal heart sounds.   Pulmonary:      Effort: Pulmonary effort is normal.      Breath sounds: Normal breath sounds.   Musculoskeletal:      Cervical back: Normal range of motion. No rigidity.   Lymphadenopathy:      Cervical: Cervical adenopathy present.   Skin:     General: Skin is warm and dry.   Neurological:      General: No focal deficit present.      Mental Status: He is alert. Mental status is at baseline.                           Assessment & Plan        1. Pharyngitis, unspecified etiology  - POCT Rapid Strep A  - methylPREDNISolone (MEDROL DOSEPAK) 4 MG Tablet Therapy Pack; Per  directions on package  Dispense: 21 Tablet; Refill: 0  - CULTURE THROAT; Future  - lidocaine (XYLOCAINE) 2 % Solution; Take 5 mL by mouth as needed for Throat/Mouth Pain for up to 5 days.  Dispense: 100 mL; Refill: 0    2. Upper respiratory tract infection, unspecified type  - CoV-2 and Flu A/B by PCR (24 hour In-House): Collect NP swab in [facility]; Future    3. Exudative tonsillitis  - POCT Rapid Strep A  - methylPREDNISolone (MEDROL DOSEPAK) 4 MG Tablet Therapy Pack; Per  directions on package  Dispense: 21 Tablet; Refill: 0  - CULTURE THROAT; Future  - lidocaine (XYLOCAINE) 2 % Solution; Take 5 mL by mouth as needed for Throat/Mouth Pain for up to 5 days.  Dispense: 100 mL; Refill: 0     POCT Rapid Strep A: NEGATIVE  CULTURE THROAT collected and sent out to lab.  Patient will be notified of any positive results and started on appropriate antibiotic therapy at that time if indicated.  CoV-2 and Flu A/B by PCR collected and results will be available via [employer].    Patient educated on supportive care measures. Supportive measures may include:Increase daily water intake and make sure getting adequate  rest. OTC analgesics and antipyretics (i.e. NSAIDs and acetaminophen) can be used for pain and fever relief as needed.Mechanical saline irrigation and intranasal saline spray may temporarily improve nasal patency and loosen secretions. Inhalation of warm/humidified air (steam) may provide relief of symptoms.     Differential diagnoses, supportive care, and indications for immediate follow-up discussed with patient. Pathogenesis of diagnosis discussed including typical length and natural progression (See AVS).    Instructed to return to urgent care or nearest emergency department if symptoms fail to improve, for any change in condition, further concerns, or new concerning symptoms.    Patient states understanding and agrees with the plan of care and discharge instructions.

## 2022-11-21 LAB
BACTERIA SPEC RESP CULT: NORMAL
SIGNIFICANT IND 70042: NORMAL
SITE SITE: NORMAL
SOURCE SOURCE: NORMAL

## 2022-11-29 ENCOUNTER — OFFICE VISIT (OUTPATIENT)
Dept: URGENT CARE | Facility: PHYSICIAN GROUP | Age: 32
End: 2022-11-29
Payer: COMMERCIAL

## 2022-11-29 VITALS
BODY MASS INDEX: 42.38 KG/M2 | SYSTOLIC BLOOD PRESSURE: 126 MMHG | RESPIRATION RATE: 18 BRPM | HEART RATE: 131 BPM | WEIGHT: 270 LBS | TEMPERATURE: 97.4 F | HEIGHT: 67 IN | OXYGEN SATURATION: 97 % | DIASTOLIC BLOOD PRESSURE: 86 MMHG

## 2022-11-29 DIAGNOSIS — R09.81 NASAL CONGESTION: ICD-10-CM

## 2022-11-29 DIAGNOSIS — H92.01 RIGHT EAR PAIN: ICD-10-CM

## 2022-11-29 DIAGNOSIS — J01.90 ACUTE NON-RECURRENT SINUSITIS, UNSPECIFIED LOCATION: ICD-10-CM

## 2022-11-29 PROCEDURE — 99213 OFFICE O/P EST LOW 20 MIN: CPT | Performed by: NURSE PRACTITIONER

## 2022-11-29 RX ORDER — METHYLPREDNISOLONE 4 MG/1
TABLET ORAL
Qty: 1 EACH | Refills: 0 | Status: SHIPPED | OUTPATIENT
Start: 2022-11-29

## 2022-11-29 RX ORDER — FLUTICASONE PROPIONATE 50 MCG
SPRAY, SUSPENSION (ML) NASAL
Qty: 9.1 ML | Refills: 0 | Status: SHIPPED | OUTPATIENT
Start: 2022-11-29

## 2022-11-29 RX ORDER — AMOXICILLIN AND CLAVULANATE POTASSIUM 875; 125 MG/1; MG/1
1 TABLET, FILM COATED ORAL 2 TIMES DAILY
Qty: 10 TABLET | Refills: 0 | Status: SHIPPED | OUTPATIENT
Start: 2022-11-29 | End: 2022-12-04

## 2022-11-29 ASSESSMENT — ENCOUNTER SYMPTOMS
CONSTITUTIONAL NEGATIVE: 1
SHORTNESS OF BREATH: 0
COUGH: 1
FEVER: 0

## 2022-11-29 ASSESSMENT — VISUAL ACUITY: OU: 1

## 2022-11-30 NOTE — PROGRESS NOTES
Subjective:     Ben Oquendo is a 32 y.o. male who presents for Pharyngitis (Steroids helped, but now back/Onset 2 weeks/Strep, COVID, Flu negative last visit), Cough (Productive/Onset 1 week), and Otalgia (R ear pain/Onset 1 week)       Pharyngitis   This is a new problem. Associated symptoms include congestion, coughing and ear pain (Right). Pertinent negatives include no shortness of breath.   Cough  Associated symptoms include ear pain (Right). Pertinent negatives include no fever or shortness of breath.   Otalgia   Associated symptoms include coughing.     Patient seen in urgent care 11/18/2022.  Had sore throat and nasal congestion.  Strep swab was negative.  Throat culture was negative.  Flu and COVID PCR negative.    Treated with Medrol Dosepak and viscous lidocaine.    Patient reports initial improvement in symptoms on medication.  However, getting worse after finishing the steroid.  Reports worsening right ear pain.  Reports worsening sore throat.    Reports his symptoms initially started 1 week prior to his initial visit.  Denies history of environmental allergies.    Review of Systems   Constitutional: Negative.  Negative for fever and malaise/fatigue.   HENT:  Positive for congestion and ear pain (Right).    Respiratory:  Positive for cough. Negative for shortness of breath.    All other systems reviewed and are negative.    Refer to HPI for additional details.    During this visit, appropriate PPE was worn, hand hygiene was performed, and the patient and any visitors were masked.    PMH:  has a past medical history of Asthma, Back strain (7/13/2015), and Migraine headache.    MEDS:   Current Outpatient Medications:     amoxicillin-clavulanate (AUGMENTIN) 875-125 MG Tab, Take 1 Tablet by mouth 2 times a day for 5 days., Disp: 10 Tablet, Rfl: 0    methylPREDNISolone (MEDROL DOSEPAK) 4 MG Tablet Therapy Pack, Use as directed on package. May take all of Day 1 as a single dose (24 mg) when  "starting., Disp: 1 Each, Rfl: 0    fluticasone (FLONASE) 50 MCG/ACT nasal spray, 1 spray in each nostril 2 times a day, Disp: 9.1 mL, Rfl: 0    ALLERGIES: No Known Allergies  SURGHX: History reviewed. No pertinent surgical history.  SOCHX:  reports that he has never smoked. He has never used smokeless tobacco. He reports that he does not currently use alcohol after a past usage of about 4.8 oz per week. He reports that he does not currently use drugs after having used the following drugs: Marijuana.    FH: Per HPI as applicable/pertinent.      Objective:     /86 (BP Location: Right arm, Patient Position: Sitting, BP Cuff Size: Large adult)   Pulse (!) 131   Temp 36.3 °C (97.4 °F) (Temporal)   Resp 18   Ht 1.702 m (5' 7\")   Wt 122 kg (270 lb)   SpO2 97%   BMI 42.29 kg/m²     Physical Exam  Nursing note reviewed.   Constitutional:       General: He is not in acute distress.     Appearance: He is well-developed. He is not ill-appearing or toxic-appearing.   HENT:      Right Ear: Tympanic membrane normal. Tympanic membrane is not perforated, erythematous or bulging.      Left Ear: Tympanic membrane normal. Tympanic membrane is not perforated, erythematous or bulging.      Nose: Congestion present.      Mouth/Throat:      Mouth: Mucous membranes are moist.      Pharynx: Oropharynx is clear.   Eyes:      General: Vision grossly intact.   Cardiovascular:      Rate and Rhythm: Tachycardia present.   Pulmonary:      Effort: Pulmonary effort is normal. No respiratory distress.      Comments: Phonation normal, speaks in full sentences, no audible wheeze or stridor   Musculoskeletal:         General: No deformity. Normal range of motion.   Skin:     General: Skin is warm and dry.      Coloration: Skin is not pale.   Neurological:      Mental Status: He is alert and oriented to person, place, and time.      Motor: No weakness.   Psychiatric:         Behavior: Behavior normal. Behavior is cooperative. "       Assessment/Plan:     1. Nasal congestion  - methylPREDNISolone (MEDROL DOSEPAK) 4 MG Tablet Therapy Pack; Use as directed on package. May take all of Day 1 as a single dose (24 mg) when starting.  Dispense: 1 Each; Refill: 0  - fluticasone (FLONASE) 50 MCG/ACT nasal spray; 1 spray in each nostril 2 times a day  Dispense: 9.1 mL; Refill: 0    2. Right ear pain  - methylPREDNISolone (MEDROL DOSEPAK) 4 MG Tablet Therapy Pack; Use as directed on package. May take all of Day 1 as a single dose (24 mg) when starting.  Dispense: 1 Each; Refill: 0  - fluticasone (FLONASE) 50 MCG/ACT nasal spray; 1 spray in each nostril 2 times a day  Dispense: 9.1 mL; Refill: 0    3. Acute non-recurrent sinusitis, unspecified location  - amoxicillin-clavulanate (AUGMENTIN) 875-125 MG Tab; Take 1 Tablet by mouth 2 times a day for 5 days.  Dispense: 10 Tablet; Refill: 0  - methylPREDNISolone (MEDROL DOSEPAK) 4 MG Tablet Therapy Pack; Use as directed on package. May take all of Day 1 as a single dose (24 mg) when starting.  Dispense: 1 Each; Refill: 0  - fluticasone (FLONASE) 50 MCG/ACT nasal spray; 1 spray in each nostril 2 times a day  Dispense: 9.1 mL; Refill: 0    Greater than 2 weeks since initial symptom onset.  Had initial improvement now worsening.  Denies history of environmental allergies.  Empiric antibiotic for sinusitis.  Second round of Medrol Dosepak for symptom relief.  Start Flonase.  Start OTC Zyrtec/Claritin.    Warning signs reviewed. Return precautions discussed.     Differential diagnosis, natural history, supportive care, over-the-counter symptom management per 's instructions, close monitoring, and indications for immediate follow-up discussed.     All questions answered. Patient agrees with the plan of care.

## 2022-12-26 DIAGNOSIS — H92.01 RIGHT EAR PAIN: ICD-10-CM

## 2022-12-26 DIAGNOSIS — J01.90 ACUTE NON-RECURRENT SINUSITIS, UNSPECIFIED LOCATION: ICD-10-CM

## 2022-12-26 DIAGNOSIS — R09.81 NASAL CONGESTION: ICD-10-CM

## 2023-03-26 RX ORDER — FLUTICASONE PROPIONATE 50 MCG
SPRAY, SUSPENSION (ML) NASAL
Qty: 48 G | OUTPATIENT
Start: 2023-03-26

## 2024-11-06 ENCOUNTER — OFFICE VISIT (OUTPATIENT)
Dept: URGENT CARE | Facility: PHYSICIAN GROUP | Age: 34
End: 2024-11-06
Payer: COMMERCIAL

## 2024-11-06 VITALS
DIASTOLIC BLOOD PRESSURE: 78 MMHG | WEIGHT: 270.06 LBS | HEART RATE: 113 BPM | SYSTOLIC BLOOD PRESSURE: 118 MMHG | HEIGHT: 67 IN | RESPIRATION RATE: 21 BRPM | OXYGEN SATURATION: 98 % | BODY MASS INDEX: 42.39 KG/M2 | TEMPERATURE: 99 F

## 2024-11-06 DIAGNOSIS — J45.901 MILD ASTHMA EXACERBATION: ICD-10-CM

## 2024-11-06 DIAGNOSIS — H66.002 NON-RECURRENT ACUTE SUPPURATIVE OTITIS MEDIA OF LEFT EAR WITHOUT SPONTANEOUS RUPTURE OF TYMPANIC MEMBRANE: ICD-10-CM

## 2024-11-06 DIAGNOSIS — R50.9 FEVER, UNSPECIFIED FEVER CAUSE: ICD-10-CM

## 2024-11-06 DIAGNOSIS — J06.9 UPPER RESPIRATORY TRACT INFECTION, UNSPECIFIED TYPE: ICD-10-CM

## 2024-11-06 LAB
FLUAV RNA SPEC QL NAA+PROBE: NEGATIVE
FLUBV RNA SPEC QL NAA+PROBE: NEGATIVE
RSV RNA SPEC QL NAA+PROBE: NEGATIVE
SARS-COV-2 RNA RESP QL NAA+PROBE: NEGATIVE

## 2024-11-06 PROCEDURE — 0241U POCT CEPHEID COV-2, FLU A/B, RSV - PCR: CPT

## 2024-11-06 PROCEDURE — 3078F DIAST BP <80 MM HG: CPT

## 2024-11-06 PROCEDURE — 3074F SYST BP LT 130 MM HG: CPT

## 2024-11-06 PROCEDURE — 99214 OFFICE O/P EST MOD 30 MIN: CPT

## 2024-11-06 RX ORDER — PREDNISONE 10 MG/1
40 TABLET ORAL DAILY
Qty: 20 TABLET | Refills: 0 | Status: SHIPPED | OUTPATIENT
Start: 2024-11-06 | End: 2024-11-11

## 2024-11-06 RX ORDER — ALBUTEROL SULFATE 90 UG/1
2 INHALANT RESPIRATORY (INHALATION) EVERY 6 HOURS PRN
Qty: 8.5 G | Refills: 0 | Status: SHIPPED | OUTPATIENT
Start: 2024-11-06

## 2024-11-06 RX ORDER — DEXTROMETHORPHAN HYDROBROMIDE AND PROMETHAZINE HYDROCHLORIDE 15; 6.25 MG/5ML; MG/5ML
5 SYRUP ORAL EVERY 6 HOURS PRN
Qty: 118 ML | Refills: 0 | Status: SHIPPED | OUTPATIENT
Start: 2024-11-06 | End: 2024-11-13

## 2024-11-06 NOTE — LETTER
November 6, 2024      To Whom It May Concern:         This is confirmation that Ben Oquendo attended his scheduled appointment with ESTEFANY Wynn on 11/06/24.    Please excuse patient from work until he is free of fever for 24 hours without the use of Tylenol or Ibuprofen.         If you have any questions please do not hesitate to call me at the phone number listed below.    Sincerely,          Ivelsise Bagley A.P.R.N.  [phone]

## 2024-11-06 NOTE — PROGRESS NOTES
Subjective:   Ben Oquendo is a 34 y.o. male who presents for Cough (C/o cough, sore throat x3 days. Overall tired. )      HPI:    Patient presents to urgent care with concerns of rhinorrhea, nasal congestion, headache, sore throat, dry cough.  Reports bilateral ear pain started yesterday. The left is worse than the right. Denies otorrhea. Pain is described as pressure and fullness. Radiates to jaw.  Onset was 3 days ago  Reports wheezing, chest tightness, SOB  Has history of asthma, states it is mostly only triggered with cold and flu like illnesses.  Reports subjective fever, chills, and body aches  Tolerating solids and fluids. Endorses normal urinary output.  Denies known sick contacts  Mild improvement with rest, dayquil, nyquil  Denies rash    ROS As above in HPI    Medications:    Current Outpatient Medications on File Prior to Visit   Medication Sig Dispense Refill    fluticasone (FLONASE) 50 MCG/ACT nasal spray 1 spray in each nostril 2 times a day (Patient not taking: Reported on 11/6/2024) 9.1 mL 0     No current facility-administered medications on file prior to visit.        Allergies:   Patient has no known allergies.    Problem List:   Patient Active Problem List   Diagnosis    Migraines    Exercise-induced asthma    Chronic left-sided thoracic back pain    BMI 40.0-44.9, adult (HCC)    Anxiety and depression        Surgical History:  No past surgical history on file.    Past Social Hx:   Social History     Tobacco Use    Smoking status: Never    Smokeless tobacco: Never   Vaping Use    Vaping status: Every Day    Substances: Nicotine    Devices: Disposable   Substance Use Topics    Alcohol use: Not Currently     Alcohol/week: 4.8 oz     Types: 8 Shots of liquor per week     Comment: Previously drank, no longer drinks    Drug use: Not Currently     Types: Marijuana     Comment: occasionally           Problem list, medications, and allergies reviewed by myself today in Epic.     Objective:  "    /78 (BP Location: Right arm, Patient Position: Sitting, BP Cuff Size: Adult long)   Pulse (!) 113   Temp 37.2 °C (99 °F) (Temporal)   Resp (!) 21   Ht 1.702 m (5' 7\") Comment: Pt reported  Wt 123 kg (270 lb 1 oz)   SpO2 98%   BMI 42.30 kg/m²     Physical Exam  Vitals and nursing note reviewed.   Constitutional:       General: He is not in acute distress.     Appearance: Normal appearance. He is not ill-appearing or diaphoretic.   HENT:      Head: Normocephalic.      Right Ear: Tympanic membrane and ear canal normal.      Left Ear: Tympanic membrane and ear canal normal.      Nose: Congestion and rhinorrhea present.      Mouth/Throat:      Mouth: Mucous membranes are moist.      Pharynx: Oropharynx is clear. Posterior oropharyngeal erythema present.   Cardiovascular:      Rate and Rhythm: Regular rhythm. Tachycardia present.      Heart sounds: Normal heart sounds. No murmur heard.     No friction rub. No gallop.   Pulmonary:      Effort: Pulmonary effort is normal. No respiratory distress.      Breath sounds: No stridor. Examination of the right-lower field reveals decreased breath sounds. Examination of the left-lower field reveals decreased breath sounds. Decreased breath sounds present. No wheezing, rhonchi or rales.   Chest:      Chest wall: No tenderness.   Abdominal:      General: Abdomen is flat. Bowel sounds are normal.      Palpations: Abdomen is soft.   Musculoskeletal:      Cervical back: No rigidity or tenderness.   Lymphadenopathy:      Cervical: No cervical adenopathy.   Skin:     General: Skin is warm and dry.      Capillary Refill: Capillary refill takes less than 2 seconds.      Findings: No rash.   Neurological:      Mental Status: He is alert and oriented to person, place, and time.         Assessment/Plan:     Results for orders placed or performed in visit on 11/06/24   POCT CoV-2, Flu A/B, RSV by PCR    Collection Time: 11/06/24 12:51 PM   Result Value Ref Range    SARS-CoV-2 by " PCR Negative Negative, Invalid    Influenza virus A RNA Negative Negative, Invalid    Influenza virus B, PCR Negative Negative, Invalid    RSV, PCR Negative Negative, Invalid         Diagnosis and associated orders:   1. Upper respiratory tract infection, unspecified type  - promethazine-dextromethorphan (PROMETHAZINE-DM) 6.25-15 MG/5ML syrup; Take 5 mL by mouth every 6 hours as needed for Cough for up to 7 days. (caution: may cause sedation)  Dispense: 118 mL; Refill: 0    2. Non-recurrent acute suppurative otitis media of left ear without spontaneous rupture of tympanic membrane  - amoxicillin-clavulanate (AUGMENTIN) 875-125 MG Tab; Take 1 Tablet by mouth 2 times a day for 7 days.  Dispense: 14 Tablet; Refill: 0    3. Fever, unspecified fever cause  - POCT CoV-2, Flu A/B, RSV by PCR    4. Mild asthma exacerbation  - albuterol 108 (90 Base) MCG/ACT Aero Soln inhalation aerosol; Inhale 2 Puffs every 6 hours as needed for Shortness of Breath.  Dispense: 8.5 g; Refill: 0  - predniSONE (DELTASONE) 10 MG Tab; Take 4 Tablets by mouth every day for 5 days.  Dispense: 20 Tablet; Refill: 0        Comments/MDM:       Patient tested negative for covid, influenza, rsv in office.   Left TM is erythematous and bulging. There is effusion present. His lungs are clear but diminished in the bilateral lower lobes, frequent dry cough is present. Patient has history of asthma and suspect he has mild exacerbation of asthma. Declined breathing treatment in office. Will order albuterol and prednisone.   Supportive measures encouraged: Rest, increased oral hydration, NSAIDs/tylenol as needed per package instructions, decongestant, warm humidification, otc antihistamines, Flonase, cough suppressant as needed   Strict return to ER precautions reviewed  Follow-up with primary care advised  Work note provided        Return to clinic or go to ED if symptoms worsen or persist. Indications for ED discussed at length. Patient/Parent/Guardian  voices understanding. Follow-up with your primary care provider in 3-5 days. Red flag symptoms discussed. All side effects of medication discussed including allergic response, GI upset, tendon injury, rash, sedation etc.    Please note that this dictation was created using voice recognition software. I have made a reasonable attempt to correct obvious errors, but I expect that there are errors of grammar and possibly content that I did not discover before finalizing the note.    This note was electronically signed by JONH Mcintosh